# Patient Record
Sex: FEMALE | Race: BLACK OR AFRICAN AMERICAN | NOT HISPANIC OR LATINO | URBAN - METROPOLITAN AREA
[De-identification: names, ages, dates, MRNs, and addresses within clinical notes are randomized per-mention and may not be internally consistent; named-entity substitution may affect disease eponyms.]

---

## 2024-05-23 ENCOUNTER — APPOINTMENT (RX ONLY)
Dept: URBAN - METROPOLITAN AREA CLINIC 75 | Facility: CLINIC | Age: 54
Setting detail: DERMATOLOGY
End: 2024-05-23

## 2024-05-23 DIAGNOSIS — D22 MELANOCYTIC NEVI: ICD-10-CM

## 2024-05-23 DIAGNOSIS — L57.8 OTHER SKIN CHANGES DUE TO CHRONIC EXPOSURE TO NONIONIZING RADIATION: ICD-10-CM

## 2024-05-23 DIAGNOSIS — L72.8 OTHER FOLLICULAR CYSTS OF THE SKIN AND SUBCUTANEOUS TISSUE: ICD-10-CM

## 2024-05-23 PROBLEM — D22.9 MELANOCYTIC NEVI, UNSPECIFIED: Status: ACTIVE | Noted: 2024-05-23

## 2024-05-23 PROCEDURE — ? COUNSELING

## 2024-05-23 PROCEDURE — ? RETURN TO REFERRING PROVIDER

## 2024-05-23 PROCEDURE — ? ADDITIONAL NOTES

## 2024-05-23 PROCEDURE — 99203 OFFICE O/P NEW LOW 30 MIN: CPT

## 2024-05-23 PROCEDURE — ? PRESCRIPTION MEDICATION MANAGEMENT

## 2024-05-23 ASSESSMENT — LOCATION SIMPLE DESCRIPTION DERM: LOCATION SIMPLE: ABDOMEN

## 2024-05-23 ASSESSMENT — LOCATION DETAILED DESCRIPTION DERM: LOCATION DETAILED: SUBXIPHOID

## 2024-05-23 ASSESSMENT — LOCATION ZONE DERM: LOCATION ZONE: TRUNK

## 2024-05-23 NOTE — PROCEDURE: ADDITIONAL NOTES
Additional Notes: Pt was referred here from her PCP. She states she has had a lump in the center of the chest since January of 2024. She states it has sometimes caused her to be short of breath when exercising or talking. The area has not drained any fluid in the past and it is not painful. On exam, there is a 3.5 cm cyst that is 2.5 cm deep under the skin. Mass is nodular in nature and firm. Dx is likely a cyst vs lipoma. I informed that since the cyst may be the source of her breathing issues, it is possible that the cyst may be pressing upon her lungs. I advised that she should be referred to a general or cardiothoracic surgeon so they have the proper setting and tools to excise the cyst in a controlled environment. I informed that this is a higher risk procedure due to the depth and size of the cyst and there would be risk of complication if done in office.
Detail Level: Simple
Render Risk Assessment In Note?: no

## 2024-05-23 NOTE — PROCEDURE: PRESCRIPTION MEDICATION MANAGEMENT
Plan: Advised use of zinc based sunscreen with SPF30+
Render In Strict Bullet Format?: No
Detail Level: Zone